# Patient Record
Sex: FEMALE | Race: WHITE
[De-identification: names, ages, dates, MRNs, and addresses within clinical notes are randomized per-mention and may not be internally consistent; named-entity substitution may affect disease eponyms.]

---

## 2023-03-18 ENCOUNTER — HOSPITAL ENCOUNTER (EMERGENCY)
Dept: HOSPITAL 97 - ER | Age: 23
Discharge: HOME | End: 2023-03-18
Payer: COMMERCIAL

## 2023-03-18 VITALS — DIASTOLIC BLOOD PRESSURE: 68 MMHG | OXYGEN SATURATION: 99 % | TEMPERATURE: 98.9 F | SYSTOLIC BLOOD PRESSURE: 99 MMHG

## 2023-03-18 DIAGNOSIS — F17.210: ICD-10-CM

## 2023-03-18 DIAGNOSIS — J06.9: Primary | ICD-10-CM

## 2023-03-18 LAB — SP GR UR: 1.02 (ref 1–1.03)

## 2023-03-18 PROCEDURE — 87081 CULTURE SCREEN ONLY: CPT

## 2023-03-18 PROCEDURE — 81025 URINE PREGNANCY TEST: CPT

## 2023-03-18 PROCEDURE — 87070 CULTURE OTHR SPECIMN AEROBIC: CPT

## 2023-03-18 PROCEDURE — 81015 MICROSCOPIC EXAM OF URINE: CPT

## 2023-03-18 PROCEDURE — 81003 URINALYSIS AUTO W/O SCOPE: CPT

## 2023-03-18 PROCEDURE — 99283 EMERGENCY DEPT VISIT LOW MDM: CPT

## 2023-03-18 NOTE — EDPHYS
Physician Documentation                                                                           

 [facility]                                                                 

Name: Ina Blanc                                                                                 

Age: 23 yrs                                                                                       

Sex: Female                                                                                       

: 2000                                                                                   

MRN: [id number]                                                                                   

Arrival Date: 2023                                                                          

Time: 10:00                                                                                       

Account#: [id number]                                                                            

Bed 12                                                                                            

Private MD:                                                                                       

ED Physician Harry Myers                                                                       

HPI:                                                                                              

                                                                                             

10:07 This 23 yrs old  Female presents to ER via Unassigned with complaints of Sore   bs3 

      Throat, Fever.                                                                              

10:07 23-year-old history of urinary retention after the birth of her child otherwise no past bs3 

      medical history presents with cough fever body aches and sore throat throat is worse        

      with swallowing better without she is tolerating liquid without any difficulty her          

      daughter is sick with a viral illness but does not have a fever fevers ongoing for 2        

      days despite over-the-counter medicines.                                                    

                                                                                                  

Historical:                                                                                       

- Allergies:                                                                                      

10:10 "strong antibiotic starts with X";                                                      ll1 

- PMHx:                                                                                           

10:10 None;                                                                                   ll1 

- PSHx:                                                                                           

10:10 None;                                                                                   ll1 

                                                                                                  

- Immunization history:: Client reports receiving the 2nd dose of the Covid vaccine.              

- Social history:: Smoking status: Patient reports the use of cigarette tobacco                   

  products, smokes one-half pack cigarettes per day.                                              

                                                                                                  

                                                                                                  

ROS:                                                                                              

10:07 Constitutional: Positive for fevers and chills                                          bs3 

10:07 All other systems are negative.                                                             

                                                                                                  

Exam:                                                                                             

10:07 Constitutional:  This is a well developed, well nourished patient who is awake, alert,  bs3 

      and in no acute distress. Head/Face:  Normocephalic, atraumatic. Eyes:  Pupils equal        

      round and reactive to light, extra-ocular motions intact.  Lids and lashes normal.          

      ENT:  No trismus, moist mucosal membranes, mild posterior pharyngeal erythema slight        

      uvular enlargement no PTA no drooling Neck:  Trachea midline, no thyromegaly, no neck       

      stiffness Chest/axilla:  Normal chest wall appearance and motion.  Nontender with no        

      deformity.  No lesions are appreciated. Cardiovascular:  tachycardic, no murmur             

      Respiratory:  Lungs have equal breath sounds bilaterally, clear to auscultation, no         

      respiratory distress Abdomen/GI:  Soft, non-tender, no rebound or guarding Skin:  Warm,     

      dry with normal turgor.  Normal color with no rashes, no lesions, and no evidence of        

      cellulitis. MS/ Extremity:  Pulses equal, no cyanosis.  Neurovascular intact.  Full,        

      normal range of motion. Neuro:  Awake and alert, GCS 15, oriented to person, place,         

      time, and situation.  Cranial nerves II-XII grossly intact.  Motor strength 5/5 in all      

      extremities.  Sensory grossly intact.                                                       

                                                                                                  

Vital Signs:                                                                                      

10:10  / 82; Pulse 124; Resp 19; Temp 101.8; Pulse Ox 100% ; Weight 75.3 kg; Height 5   ll1 

      ft. 1 in. ; Pain 10/10;                                                                     

11:31 BP 99 / 68; Pulse 107; Resp 17; Temp 98.9; Pulse Ox 99% ; Pain 7/10;                    jl7 

10:10 Body Mass Index 31.37 (75.30 kg, 154.94 cm)                                             ll1 

10:10 Pain Scale: Adult                                                                       ll1 

11:31 Pain Scale: Adult                                                                       jl7 

                                                                                                  

MDM:                                                                                              

10:01 Patient medically screened.                                                             bs3 

10:07 Differential diagnosis: viral syndrome possible flu or covid possible strep, less       bs3 

      likely mono, given hx of uti with pregnancy and body aches, will check urine. Data          

      reviewed: vital signs, nurses notes.                                                        

10:42 ED course: Nitrate were positive but patient without urinary symptoms therefore I am    bs3 

      not suspecting sepsis.                                                                      

10:53 ED course: Reassessed, sleeping resting comfortably heart rate improved to 104.         bs3 

11:29 ED course: Urine with nitrite positive but all other test negative likely false         bs3 

      positive nitrate in the setting of over-the-counter medications she has no urinary          

      symptoms therefore it is unlikely to be urinary tract infection.                            

                                                                                                  

                                                                                             

10:07 Order name: Urine Dipstick-Ancillary (obtain specimen); Complete Time: 10:24            bs3 

                                                                                             

10:07 Order name: Urine Pregnancy Test (obtain specimen); Complete Time: 10:25                bs3 

                                                                                             

10:07 Order name: Strep; Complete Time: 11:27                                                 bs3 

                                                                                             

10:38 Order name: Urine Pregnancy--Ancillary (enter results); Complete Time: 11:27            kj1 

                                                                                             

10:36 Order name: Urine Dipstick-Ancillary; Complete Time: 10:38                              EDMS

                                                                                             

11:04 Order name: Urine Microscopic Only; Complete Time: 11:27                                EDMS

                                                                                             

11:16 Order name: Throat Culture                                                              EDMS

                                                                                                  

Administered Medications:                                                                         

10:24 Drug: Acetaminophen PO 1000 mg Route: PO;                                               ll1 

11:32 Follow up: Response: No adverse reaction; Temperature is decreased                      jl7 

10:24 Drug: Ibuprofen  mg Route: PO;                                                    ll1 

11:31 Follow up: Response: No adverse reaction; Temperature is decreased                      jl7 

10:24 Drug: Ondansetron PO 8 mg Route: PO;                                                    ll1 

11:31 Follow up: Response: No adverse reaction; Nausea is decreased                           jl7 

                                                                                                  

                                                                                                  

Disposition Summary:                                                                              

23 11:34                                                                                    

Discharge Ordered                                                                                 

      Location: Home                                                                          bs3 

      Problem: new                                                                            bs3 

      Symptoms: have improved                                                                 bs3 

      Condition: Stable                                                                       bs3 

      Diagnosis                                                                                   

        - Acute upper respiratory infection, unspecified                                      bs3 

      Followup:                                                                               bs3 

        - With: Private Physician                                                                  

        - When: 2 - 3 days                                                                         

        - Reason: Re-evaluation by your physician                                                  

      Forms:                                                                                      

        - Medication Reconciliation Form                                                      bs3 

        - Thank You Letter                                                                    bs3 

        - Antibiotic Education                                                                bs3 

        - Prescription Opioid Use                                                             bs3 

Signatures:                                                                                       

Dispatcher [employer]                           EDShade Garzon RN RN   ll1                                                  

Harry Myers MD MD   bs3                                                  

Zay Garg RN   jl7                                                  

                                                                                                  

Corrections: (The following items were deleted from the chart)                                    

10:15 10:07 Constitutional: This is a well developed, well nourished patient who is awake,    bs3 

      alert, and in no acute distress. Head/Face: Normocephalic, atraumatic. Eyes: Pupils         

      equal round and reactive to light, extra-ocular motions intact. Lids and lashes normal.     

      ENT: No trismus, moist mucosal membranes, mild posterior pharyngeal erythema slight         

      uvular enlargement no PTA no drooling Neck: Trachea midline, no thyromegaly, no neck        

      stiffness Chest/axilla: Normal chest wall appearance and motion. Nontender with no          

      deformity. No lesions are appreciated. Cardiovascular: Regular rate and rhythm with a       

      normal S1 and S2. symmetric pulses in upper extremities Respiratory: Lungs have equal       

      breath sounds bilaterally, clear to auscultation, no respiratory distress Abdomen/GI:       

      Soft, non-tender, no rebound or guarding Skin: Warm, dry with normal turgor. Normal         

      color with no rashes, no lesions, and no evidence of cellulitis. MS/ Extremity: Pulses      

      equal, no cyanosis. Neurovascular intact. Full, normal range of motion. Neuro: Awake        

      and alert, GCS 15, oriented to person, place, time, and situation. Cranial nerves           

      II-XII grossly intact. Motor strength 5/5 in all extremities. Sensory grossly intact.       

      bs3                                                                                         

11:04 10:39 Urinalysis+U.LAB.BRZ ordered. EDMS                                                EDMS

                                                                                                  

**************************************************************************************************

## 2023-03-18 NOTE — ER
Nurse's Notes                                                                                     

 [facility]                                                                 

Name: Ina Blanc                                                                                 

Age: 23 yrs                                                                                       

Sex: Female                                                                                       

: 2000                                                                                   

MRN: [id number]                                                                                   

Arrival Date: 2023                                                                          

Time: 10:00                                                                                       

Account#: [id number]                                                                            

Bed 12                                                                                            

Private MD:                                                                                       

Diagnosis: Acute upper respiratory infection, unspecified                                         

                                                                                                  

Presentation:                                                                                     

                                                                                             

10:10 Chief complaint: Patient states: Sore throat and fever for 2 days. + N/V. Coronavirus   ll1 

      screen: Vaccine status: Patient reports receiving the 2nd dose of the covid vaccine.        

      Client denies travel out of the [place] in the last 14 days. fatigue, fever, headache,         

      muscle pain, nausea, sore throat, vomiting. Client presents with at least one sign or       

      symptom that may indicate coronavirus-19. Standard/surgical mask placed on the client.      

      Ebola Screen: Patient denies travel to an Ebola-affected area in the 21 days before         

      illness onset. Initial Sepsis Screen: Does the patient meet any 2 criteria? No.             

      Patient's initial sepsis screen is negative. Does the patient have a suspected source       

      of infection? Yes: Other: sore throat. Risk Assessment: Do you want to hurt yourself or     

      someone else? Patient reports no desire to harm self or others. Onset of symptoms was       

      2023.                                                                             

10:10 Method Of Arrival: Ambulatory                                                           ll1 

10:10 Acuity: JW 3                                                                           ll1 

                                                                                                  

Triage Assessment:                                                                                

10:12 General: Appears uncomfortable, ill, Behavior is cooperative, appropriate for age.      ll1 

      Pain: Complains of pain in back Quality of pain is described as aching. EENT: Reports       

      pain when swallowing. Neuro: Reports headache weakness. Respiratory: Reports cough that     

      is. GI: Reports nausea, vomiting.                                                           

                                                                                                  

Historical:                                                                                       

- Allergies:                                                                                      

10:10 "strong antibiotic starts with X";                                                      ll1 

- PMHx:                                                                                           

10:10 None;                                                                                   ll1 

- PSHx:                                                                                           

10:10 None;                                                                                   ll1 

                                                                                                  

- Immunization history:: Client reports receiving the 2nd dose of the Covid vaccine.              

- Social history:: Smoking status: Patient reports the use of cigarette tobacco                   

  products, smokes one-half pack cigarettes per day.                                              

                                                                                                  

                                                                                                  

Screenin:31 [facility] ED Fall Risk Assessment (Adult) History of falling in the last 3 months,       jl7 

      including since admission No falls in past 3 months (0 pts). Abuse screen: Denies           

      threats or abuse. Denies injuries from another. Nutritional screening: No deficits          

      noted. Tuberculosis screening: No symptoms or risk factors identified.                      

                                                                                                  

Assessment:                                                                                       

11:33 Reassessment: Patient appears in no apparent distress at this time. Patient and/or      jl7 

      family updated on plan of care and expected duration. Pain level reassessed. Patient is     

      alert, oriented x 3, equal unlabored respirations, skin warm/dry/pink. Patient states       

      symptoms have improved.                                                                     

                                                                                                  

Vital Signs:                                                                                      

10:10  / 82; Pulse 124; Resp 19; Temp 101.8; Pulse Ox 100% ; Weight 75.3 kg; Height 5   ll1 

      ft. 1 in. ; Pain 10/10;                                                                     

11:31 BP 99 / 68; Pulse 107; Resp 17; Temp 98.9; Pulse Ox 99% ; Pain 7/10;                    jl7 

10:10 Body Mass Index 31.37 (75.30 kg, 154.94 cm)                                             ll1 

10:10 Pain Scale: Adult                                                                       ll1 

11:31 Pain Scale: Adult                                                                       jl7 

                                                                                                  

ED Course:                                                                                        

10:00 Patient arrived in ED.                                                                  am2 

10:01 Harry Myers MD is Attending Physician.                                              bs3 

10:12 Triage completed.                                                                       ll1 

10:13 Arm band placed on Patient placed in an exam room, on a stretcher.                      ll1 

10:21 Strep Sent.                                                                             mm9 

10:21 Strep swab sent to lab.                                                                 mm9 

11:30 Zay Garg RN is Primary Nurse.                                                      jl7 

11:31 Patient has correct armband on for positive identification. Bed in low position. Call   jl7 

      light in reach. Side rails up X 1. Pulse ox on. NIBP on.                                    

11:31 No provider procedures requiring assistance completed. Patient did not have IV access   jl7 

      during this emergency room visit.                                                           

                                                                                                  

Administered Medications:                                                                         

10:24 Drug: Acetaminophen PO 1000 mg Route: PO;                                               ll1 

11:32 Follow up: Response: No adverse reaction; Temperature is decreased                      jl7 

10:24 Drug: Ibuprofen  mg Route: PO;                                                    ll1 

11:31 Follow up: Response: No adverse reaction; Temperature is decreased                      jl7 

10:24 Drug: Ondansetron PO 8 mg Route: PO;                                                    ll1 

11:31 Follow up: Response: No adverse reaction; Nausea is decreased                           jl7 

                                                                                                  

                                                                                                  

Outcome:                                                                                          

11:34 Discharge ordered by MD.                                                                bs3 

                                                                                                  

Signatures:                                                                                       

Zay Garg RN                        RN   jl7                                                  

Kerri Rangel Lynsay, KARLENE                       RN   ll1                                                  

Harry Myers MD MD   bs3                                                  

Parvin yRan                              mm9                                                  

                                                                                                  

**************************************************************************************************